# Patient Record
Sex: FEMALE | Race: WHITE | NOT HISPANIC OR LATINO | URBAN - METROPOLITAN AREA
[De-identification: names, ages, dates, MRNs, and addresses within clinical notes are randomized per-mention and may not be internally consistent; named-entity substitution may affect disease eponyms.]

---

## 2024-06-18 ENCOUNTER — APPOINTMENT (OUTPATIENT)
Dept: RADIOLOGY | Facility: HOSPITAL | Age: 39
End: 2024-06-18
Payer: MEDICAID

## 2024-06-18 ENCOUNTER — APPOINTMENT (OUTPATIENT)
Dept: CARDIOLOGY | Facility: HOSPITAL | Age: 39
End: 2024-06-18
Payer: MEDICAID

## 2024-06-18 ENCOUNTER — HOSPITAL ENCOUNTER (EMERGENCY)
Facility: HOSPITAL | Age: 39
Discharge: HOME | End: 2024-06-18
Payer: MEDICAID

## 2024-06-18 VITALS
TEMPERATURE: 97.3 F | WEIGHT: 159 LBS | BODY MASS INDEX: 25.55 KG/M2 | HEART RATE: 58 BPM | HEIGHT: 66 IN | SYSTOLIC BLOOD PRESSURE: 108 MMHG | RESPIRATION RATE: 16 BRPM | OXYGEN SATURATION: 96 % | DIASTOLIC BLOOD PRESSURE: 67 MMHG

## 2024-06-18 DIAGNOSIS — N30.00 ACUTE CYSTITIS WITHOUT HEMATURIA: ICD-10-CM

## 2024-06-18 DIAGNOSIS — T73.2XXA FATIGUE DUE TO EXPOSURE, INITIAL ENCOUNTER: Primary | ICD-10-CM

## 2024-06-18 LAB
ALBUMIN SERPL BCP-MCNC: 4.1 G/DL (ref 3.4–5)
ALP SERPL-CCNC: 85 U/L (ref 33–110)
ALT SERPL W P-5'-P-CCNC: 9 U/L (ref 7–45)
ANION GAP SERPL CALC-SCNC: 8 MMOL/L (ref 10–20)
APPEARANCE UR: ABNORMAL
AST SERPL W P-5'-P-CCNC: 14 U/L (ref 9–39)
BASOPHILS # BLD AUTO: 0.02 X10*3/UL (ref 0–0.1)
BASOPHILS NFR BLD AUTO: 0.2 %
BILIRUB SERPL-MCNC: 0.7 MG/DL (ref 0–1.2)
BILIRUB UR STRIP.AUTO-MCNC: NEGATIVE MG/DL
BUN SERPL-MCNC: 11 MG/DL (ref 6–23)
CALCIUM SERPL-MCNC: 8.3 MG/DL (ref 8.6–10.3)
CARDIAC TROPONIN I PNL SERPL HS: <3 NG/L (ref 0–13)
CHLORIDE SERPL-SCNC: 104 MMOL/L (ref 98–107)
CO2 SERPL-SCNC: 30 MMOL/L (ref 21–32)
COLOR UR: YELLOW
CREAT SERPL-MCNC: 0.62 MG/DL (ref 0.5–1.05)
EGFRCR SERPLBLD CKD-EPI 2021: >90 ML/MIN/1.73M*2
EOSINOPHIL # BLD AUTO: 0.43 X10*3/UL (ref 0–0.7)
EOSINOPHIL NFR BLD AUTO: 5 %
ERYTHROCYTE [DISTWIDTH] IN BLOOD BY AUTOMATED COUNT: 13.3 % (ref 11.5–14.5)
GLUCOSE SERPL-MCNC: 86 MG/DL (ref 74–99)
GLUCOSE UR STRIP.AUTO-MCNC: NORMAL MG/DL
HCG UR QL IA.RAPID: NEGATIVE
HCT VFR BLD AUTO: 42.1 % (ref 36–46)
HGB BLD-MCNC: 13.7 G/DL (ref 12–16)
IMM GRANULOCYTES # BLD AUTO: 0.02 X10*3/UL (ref 0–0.7)
IMM GRANULOCYTES NFR BLD AUTO: 0.2 % (ref 0–0.9)
KETONES UR STRIP.AUTO-MCNC: NEGATIVE MG/DL
LEUKOCYTE ESTERASE UR QL STRIP.AUTO: ABNORMAL
LYMPHOCYTES # BLD AUTO: 1.57 X10*3/UL (ref 1.2–4.8)
LYMPHOCYTES NFR BLD AUTO: 18.1 %
MCH RBC QN AUTO: 27.2 PG (ref 26–34)
MCHC RBC AUTO-ENTMCNC: 32.5 G/DL (ref 32–36)
MCV RBC AUTO: 84 FL (ref 80–100)
MONOCYTES # BLD AUTO: 0.81 X10*3/UL (ref 0.1–1)
MONOCYTES NFR BLD AUTO: 9.3 %
MUCOUS THREADS #/AREA URNS AUTO: ABNORMAL /LPF
NEUTROPHILS # BLD AUTO: 5.83 X10*3/UL (ref 1.2–7.7)
NEUTROPHILS NFR BLD AUTO: 67.2 %
NITRITE UR QL STRIP.AUTO: NEGATIVE
NRBC BLD-RTO: 0 /100 WBCS (ref 0–0)
PH UR STRIP.AUTO: 6 [PH]
PLATELET # BLD AUTO: 386 X10*3/UL (ref 150–450)
POTASSIUM SERPL-SCNC: 3.6 MMOL/L (ref 3.5–5.3)
PROT SERPL-MCNC: 6.6 G/DL (ref 6.4–8.2)
PROT UR STRIP.AUTO-MCNC: ABNORMAL MG/DL
RBC # BLD AUTO: 5.03 X10*6/UL (ref 4–5.2)
RBC # UR STRIP.AUTO: NEGATIVE /UL
RBC #/AREA URNS AUTO: ABNORMAL /HPF
SARS-COV-2 RNA RESP QL NAA+PROBE: NOT DETECTED
SODIUM SERPL-SCNC: 138 MMOL/L (ref 136–145)
SP GR UR STRIP.AUTO: 1.03
SQUAMOUS #/AREA URNS AUTO: ABNORMAL /HPF
TSH SERPL-ACNC: 1.5 MIU/L (ref 0.44–3.98)
UROBILINOGEN UR STRIP.AUTO-MCNC: NORMAL MG/DL
WBC # BLD AUTO: 8.7 X10*3/UL (ref 4.4–11.3)
WBC #/AREA URNS AUTO: ABNORMAL /HPF
WBC CLUMPS #/AREA URNS AUTO: ABNORMAL /HPF

## 2024-06-18 PROCEDURE — 71046 X-RAY EXAM CHEST 2 VIEWS: CPT | Performed by: RADIOLOGY

## 2024-06-18 PROCEDURE — 87086 URINE CULTURE/COLONY COUNT: CPT | Mod: PARLAB | Performed by: PHYSICIAN ASSISTANT

## 2024-06-18 PROCEDURE — 80053 COMPREHEN METABOLIC PANEL: CPT | Performed by: PHYSICIAN ASSISTANT

## 2024-06-18 PROCEDURE — 81003 URINALYSIS AUTO W/O SCOPE: CPT | Performed by: PHYSICIAN ASSISTANT

## 2024-06-18 PROCEDURE — 85025 COMPLETE CBC W/AUTO DIFF WBC: CPT | Performed by: PHYSICIAN ASSISTANT

## 2024-06-18 PROCEDURE — 81025 URINE PREGNANCY TEST: CPT | Performed by: PHYSICIAN ASSISTANT

## 2024-06-18 PROCEDURE — 84443 ASSAY THYROID STIM HORMONE: CPT | Performed by: PHYSICIAN ASSISTANT

## 2024-06-18 PROCEDURE — 2500000002 HC RX 250 W HCPCS SELF ADMINISTERED DRUGS (ALT 637 FOR MEDICARE OP, ALT 636 FOR OP/ED): Performed by: PHYSICIAN ASSISTANT

## 2024-06-18 PROCEDURE — 99283 EMERGENCY DEPT VISIT LOW MDM: CPT | Mod: 25

## 2024-06-18 PROCEDURE — 36415 COLL VENOUS BLD VENIPUNCTURE: CPT | Performed by: PHYSICIAN ASSISTANT

## 2024-06-18 PROCEDURE — 87635 SARS-COV-2 COVID-19 AMP PRB: CPT | Performed by: PHYSICIAN ASSISTANT

## 2024-06-18 PROCEDURE — 84484 ASSAY OF TROPONIN QUANT: CPT | Performed by: PHYSICIAN ASSISTANT

## 2024-06-18 PROCEDURE — 71046 X-RAY EXAM CHEST 2 VIEWS: CPT

## 2024-06-18 PROCEDURE — 93005 ELECTROCARDIOGRAM TRACING: CPT

## 2024-06-18 RX ORDER — NITROFURANTOIN 25; 75 MG/1; MG/1
100 CAPSULE ORAL ONCE
Status: COMPLETED | OUTPATIENT
Start: 2024-06-18 | End: 2024-06-18

## 2024-06-18 RX ORDER — NITROFURANTOIN 25; 75 MG/1; MG/1
100 CAPSULE ORAL 2 TIMES DAILY
Qty: 13 CAPSULE | Refills: 0 | Status: SHIPPED | OUTPATIENT
Start: 2024-06-18 | End: 2024-06-25

## 2024-06-18 ASSESSMENT — LIFESTYLE VARIABLES
EVER FELT BAD OR GUILTY ABOUT YOUR DRINKING: NO
TOTAL SCORE: 0
HAVE YOU EVER FELT YOU SHOULD CUT DOWN ON YOUR DRINKING: NO
EVER HAD A DRINK FIRST THING IN THE MORNING TO STEADY YOUR NERVES TO GET RID OF A HANGOVER: NO
HAVE PEOPLE ANNOYED YOU BY CRITICIZING YOUR DRINKING: NO

## 2024-06-18 ASSESSMENT — PAIN - FUNCTIONAL ASSESSMENT: PAIN_FUNCTIONAL_ASSESSMENT: 0-10

## 2024-06-18 ASSESSMENT — COLUMBIA-SUICIDE SEVERITY RATING SCALE - C-SSRS
2. HAVE YOU ACTUALLY HAD ANY THOUGHTS OF KILLING YOURSELF?: NO
6. HAVE YOU EVER DONE ANYTHING, STARTED TO DO ANYTHING, OR PREPARED TO DO ANYTHING TO END YOUR LIFE?: NO
1. IN THE PAST MONTH, HAVE YOU WISHED YOU WERE DEAD OR WISHED YOU COULD GO TO SLEEP AND NOT WAKE UP?: NO

## 2024-06-18 ASSESSMENT — PAIN SCALES - GENERAL: PAINLEVEL_OUTOF10: 0 - NO PAIN

## 2024-06-18 NOTE — ED TRIAGE NOTES
Pt to ER via triage, c/o fatigue x 1 week, pt states she has low GGE levels. Pt is a patient of [facility] but couldn't get seen at [facility].

## 2024-06-18 NOTE — ED PROVIDER NOTES
"Limitations to History: None  External Records Reviewed  Independent Historians: None  Social determinants affecting care: None    HPI  Dinah Valdez is a 38 y.o. female with history of ITP, iron deficiency anemia, Hashimoto's disease, hypogammaglobulinemia, chronic sinusitis, who presents emergency department for assessment of fatigue x 1 week.  She reports that she is in town currently taking care of her father full-time as he is stage IV cancer.  She reports that she lives in [place] and follows with a specialist for her low IgG levels.  She reports that she called her doctor who recommended she go to the ER for further assessment of her fatigue.  She reports that last week she did have some cough and congestion but this is resolved.  She has not had any fever or chills.  She denies chest pains or shortness of breath.  She denies any abdominal pain, nausea, vomiting, diarrhea.  She denies any dysuria, hematuria, urinary frequency, urinary hesitancy.  She has no further complaints.    PMH  No past medical history on file. reviewed by myself.    Meds  Current Outpatient Medications   Medication Instructions    nitrofurantoin, macrocrystal-monohydrate, (Macrobid) 100 mg capsule 100 mg, oral, 2 times daily       Allergies  No Known Allergies reviewed by myself.    SHx    reviewed by myself.      ------------------------------------------------------------------------------------------------------------------------------------------    /69 (BP Location: Right arm, Patient Position: Sitting)   Pulse 64   Temp 36.3 °C (97.3 °F) (Temporal)   Resp 18   Ht 1.676 m (5' 6\")   Wt 72.1 kg (159 lb)   SpO2 97%   BMI 25.66 kg/m²     Physical Exam  Vitals and nursing note reviewed.   Constitutional:       General: She is not in acute distress.     Appearance: Normal appearance. She is normal weight. She is not ill-appearing or toxic-appearing.   HENT:      Head: Normocephalic.      Nose: Nose normal.      " Mouth/Throat:      Mouth: Mucous membranes are moist.      Pharynx: Oropharynx is clear.   Eyes:      Extraocular Movements: Extraocular movements intact.      Conjunctiva/sclera: Conjunctivae normal.   Cardiovascular:      Rate and Rhythm: Normal rate and regular rhythm.   Pulmonary:      Effort: Pulmonary effort is normal.      Breath sounds: Normal breath sounds.   Abdominal:      General: Abdomen is flat.      Palpations: Abdomen is soft.      Tenderness: There is no abdominal tenderness.   Musculoskeletal:         General: Normal range of motion.      Cervical back: Neck supple.   Skin:     General: Skin is warm and dry.   Neurological:      Mental Status: She is alert and oriented to person, place, and time.   Psychiatric:         Attention and Perception: Attention normal.         Mood and Affect: Mood normal.          ------------------------------------------------------------------------------------------------------------------------------------------  Labs  Labs Reviewed   COMPREHENSIVE METABOLIC PANEL - Abnormal       Result Value    Glucose 86      Sodium 138      Potassium 3.6      Chloride 104      Bicarbonate 30      Anion Gap 8 (*)     Urea Nitrogen 11      Creatinine 0.62      eGFR >90      Calcium 8.3 (*)     Albumin 4.1      Alkaline Phosphatase 85      Total Protein 6.6      AST 14      Bilirubin, Total 0.7      ALT 9     URINALYSIS WITH REFLEX CULTURE AND MICROSCOPIC - Abnormal    Color, Urine Yellow      Appearance, Urine Turbid (*)     Specific Gravity, Urine 1.029      pH, Urine 6.0      Protein, Urine 10 (TRACE)      Glucose, Urine Normal      Blood, Urine NEGATIVE      Ketones, Urine NEGATIVE      Bilirubin, Urine NEGATIVE      Urobilinogen, Urine Normal      Nitrite, Urine NEGATIVE      Leukocyte Esterase, Urine 250 Janes/µL (*)    MICROSCOPIC ONLY, URINE - Abnormal    WBC, Urine 21-50 (*)     WBC Clumps, Urine RARE      RBC, Urine 3-5      Squamous Epithelial Cells, Urine 1-9 (SPARSE)       Mucus, Urine 4+     TROPONIN I, HIGH SENSITIVITY - Normal    Troponin I, High Sensitivity <3      Narrative:     Less than 99th percentile of normal range cutoff-  Female and children under 18 years old <14 ng/L; Male <21 ng/L: Negative  Repeat testing should be performed if clinically indicated.     Female and children under 18 years old 14-50 ng/L; Male 21-50 ng/L:  Consistent with possible cardiac damage and possible increased clinical   risk. Serial measurements may help to assess extent of myocardial damage.     >50 ng/L: Consistent with cardiac damage, increased clinical risk and  myocardial infarction. Serial measurements may help assess extent of   myocardial damage.      NOTE: Children less than 1 year old may have higher baseline troponin   levels and results should be interpreted in conjunction with the overall   clinical context.     NOTE: Troponin I testing is performed using a different   testing methodology at [facility] than at other   [facility]. Direct result comparisons should only   be made within the same method.   TSH WITH REFLEX TO FREE T4 IF ABNORMAL - Normal    Thyroid Stimulating Hormone 1.50      Narrative:     TSH testing is performed using different testing methodology at [facility] than at other [facility]. Direct result comparisons should only be made within the same method.     SARS-COV-2 PCR - Normal    Coronavirus 2019, PCR Not Detected      Narrative:     This assay has received FDA Emergency Use Authorization (EUA) and is only authorized for the duration of time that circumstances exist to justify the authorization of the emergency use of in vitro diagnostic tests for the detection of SARS-CoV-2 virus and/or diagnosis of COVID-19 infection under section 564(b)(1) of the Act, 21 U.S.C. 360bbb-3(b)(1). This assay is an in vitro diagnostic nucleic acid amplification test for the qualitative detection of SARS-CoV-2 from nasopharyngeal  specimens and has been validated for use at [facility]. Negative results do not preclude COVID-19 infections and should not be used as the sole basis for diagnosis, treatment, or other management decisions.     HCG, URINE, QUALITATIVE - Normal    HCG, Urine NEGATIVE     URINE CULTURE   CBC WITH AUTO DIFFERENTIAL    WBC 8.7      nRBC 0.0      RBC 5.03      Hemoglobin 13.7      Hematocrit 42.1      MCV 84      MCH 27.2      MCHC 32.5      RDW 13.3      Platelets 386      Neutrophils % 67.2      Immature Granulocytes %, Automated 0.2      Lymphocytes % 18.1      Monocytes % 9.3      Eosinophils % 5.0      Basophils % 0.2      Neutrophils Absolute 5.83      Immature Granulocytes Absolute, Automated 0.02      Lymphocytes Absolute 1.57      Monocytes Absolute 0.81      Eosinophils Absolute 0.43      Basophils Absolute 0.02     URINALYSIS WITH REFLEX CULTURE AND MICROSCOPIC    Narrative:     The following orders were created for panel order Urinalysis with Reflex Culture and Microscopic.  Procedure                               Abnormality         Status                     ---------                               -----------         ------                     Urinalysis with Reflex C...[[id number]]  Abnormal            Final result               Extra Urine Gray Tube[[id number]]                            In process                   Please view results for these tests on the individual orders.   EXTRA URINE GRAY TUBE        Imaging  XR chest 2 views   Final Result   1.  No evidence of acute cardiopulmonary process.                  MACRO:   None        Signed by: Lorenzo Martin 6/18/2024 8:04 PM   Dictation workstation:   [id number]           ED Course  Diagnoses as of 06/18/24 2127   Fatigue due to exposure, initial encounter   Acute cystitis without hematuria        Medical Decision Making: She was evaluated by myself at 1945 due to high patient acuity and volume in the emergency department.   She did have to wait in the waiting room.  Initial vital signs reviewed and are stable.  Repeat vital signs ordered and comprehensive workup initiated.    Differential diagnoses considered: Pneumonia, UTI, electrolyte abnormalities, thrombocytopenia, dehydration, KANDIS, ACS, cardiac arrhythmia, hypothyroidism, others    Medications given: Macrobid p.o.    EKG interpreted by myself and ED attending: Normal sinus rhythm.  Ventricular rate 61 bpm.  No acute ST elevation or depressions.  Inverted T wave in lead I and aVL.  Inverted T wave in V1.    I reviewed the the labs from today.  No leukocytosis or leukopenia.  Normal platelets.  No significant electrolyte abnormalities.  Troponin is negative.  Urinalysis showing leukocyte esterase with 21-50 white blood cells.  Urine culture pending.  TSH is normal.  COVID-negative.  Chest x-ray showing no acute cardiopulmonary process.  I discussed with the patient about the workup today.  She does have a UTI which could be attributing to her fatigue.  I will place her on Macrobid 100 mg twice daily for the next 7 days.  She was given first dose in emergency department.  She does not have PCP or other specialty follow-ups as she just recently moved to the area to take care of her father.  She was referred to a PCP on-call, immunologist, and rheumatologist for follow-up appointment.  She is to call tomorrow to establish care.  She is to return to the ER immediately if symptoms change or worsen.  She verbalized understanding and agreed to plan of care.  She was discharged home in stable condition.    Diagnosis: Fatigue, UTI  Plan: Discharge     Vaughn Villa PA-C  06/18/24 [id number]

## 2024-06-19 LAB
BACTERIA UR CULT: NORMAL
HOLD SPECIMEN: NORMAL

## 2024-06-19 NOTE — DISCHARGE INSTRUCTIONS
You have a urinary tract infection.  This could be increasing your fatigue.  You were started on antibiotics.  Take these as prescribed.  You were referred to immunology and rheumatology for follow-up.  You were provided with a PCP to establish care.  Make sure you get plenty of rest and drink appropriate fluids.  Return to the ER immediately if symptoms change or worsen.

## 2024-06-23 LAB
ATRIAL RATE: 61 BPM
P AXIS: 114 DEGREES
P OFFSET: 188 MS
P ONSET: 144 MS
PR INTERVAL: 150 MS
Q ONSET: 219 MS
QRS COUNT: 11 BEATS
QRS DURATION: 78 MS
QT INTERVAL: 454 MS
QTC CALCULATION(BAZETT): 457 MS
QTC FREDERICIA: 456 MS
R AXIS: 134 DEGREES
T AXIS: 147 DEGREES
T OFFSET: 446 MS
VENTRICULAR RATE: 61 BPM